# Patient Record
Sex: MALE | Race: WHITE
[De-identification: names, ages, dates, MRNs, and addresses within clinical notes are randomized per-mention and may not be internally consistent; named-entity substitution may affect disease eponyms.]

---
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
## 2020-08-17 ENCOUNTER — HOSPITAL ENCOUNTER (EMERGENCY)
Dept: HOSPITAL 50 - VM.ED | Age: 20
Discharge: ANOTHER HOSPITAL | End: 2020-08-17
Payer: COMMERCIAL

## 2020-08-17 DIAGNOSIS — S52.501A: Primary | ICD-10-CM

## 2020-08-17 DIAGNOSIS — S01.81XA: ICD-10-CM
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
## 2020-08-17 DIAGNOSIS — S01.01XA: ICD-10-CM

## 2020-08-17 LAB
ANION GAP SERPL CALC-SCNC: 13.3 MMOL/L (ref 10–20)
CHLORIDE SERPL-SCNC: 103 MMOL/L (ref 98–107)
SODIUM SERPL-SCNC: 140 MMOL/L (ref 136–145)

## 2020-08-17 PROCEDURE — 85025 COMPLETE CBC W/AUTO DIFF WBC: CPT

## 2020-08-17 PROCEDURE — 36415 COLL VENOUS BLD VENIPUNCTURE: CPT

## 2020-08-17 PROCEDURE — 73110 X-RAY EXAM OF WRIST: CPT

## 2020-08-17 PROCEDURE — 99283 EMERGENCY DEPT VISIT LOW MDM: CPT

## 2020-08-17 PROCEDURE — 85610 PROTHROMBIN TIME: CPT

## 2020-08-17 PROCEDURE — 96375 TX/PRO/DX INJ NEW DRUG ADDON: CPT

## 2020-08-17 PROCEDURE — 73030 X-RAY EXAM OF SHOULDER: CPT

## 2020-08-17 PROCEDURE — 99285 EMERGENCY DEPT VISIT HI MDM: CPT

## 2020-08-17 PROCEDURE — 96376 TX/PRO/DX INJ SAME DRUG ADON: CPT

## 2020-08-17 PROCEDURE — 80053 COMPREHEN METABOLIC PANEL: CPT

## 2020-08-17 PROCEDURE — 12015 RPR F/E/E/N/L/M 7.6-12.5 CM: CPT

## 2020-08-17 PROCEDURE — 70486 CT MAXILLOFACIAL W/O DYE: CPT

## 2020-08-17 PROCEDURE — 96374 THER/PROPH/DIAG INJ IV PUSH: CPT

## 2020-08-17 PROCEDURE — 71045 X-RAY EXAM CHEST 1 VIEW: CPT

## 2020-08-17 PROCEDURE — 70450 CT HEAD/BRAIN W/O DYE: CPT
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
## 2020-08-18 NOTE — CR
______________________________________________________________________________   

  

[id number] RAD/RAD Shoulder Left 2V Min  

EXAM:   

   

 RAD Shoulder Left 2V Min  

   

 CLINICAL DATA:   

   

 TRAUMA  

   

 COMPARISON:   

   

 NO PREVIOUS SIMILAR EXAM IS AVAILABLE.  

   

 FINDINGS:   

   

 No fracture or dislocation is seen.  

   

 There is no radiopaque foreign body in the soft tissues.  

   

 There is no air in the soft tissues.  

   

 There is no cortical thickening or periosteal reaction either.  

   

 IMPRESSION:  

   

 NEGATIVE PLAIN FILM EXAM.  

   

 Electronically signed by Markie Dunham MD on 8/18/2020 8:01 AM  

   

  

Markie Dunham MD                 

 08/18/20 0803    

  

Thank you for allowing us to participate in the care of your patient.

## 2021-05-17 ENCOUNTER — HOSPITAL ENCOUNTER (EMERGENCY)
Dept: HOSPITAL 50 - VM.ED | Age: 21
Discharge: HOME | End: 2021-05-17
Payer: SELF-PAY

## 2021-05-17 DIAGNOSIS — K62.5: Primary | ICD-10-CM

## 2021-05-17 LAB
ANION GAP SERPL CALC-SCNC: 15.8 MMOL/L (ref 5–15)
CHLORIDE SERPL-SCNC: 106 MMOL/L (ref 98–107)
SODIUM SERPL-SCNC: 143 MMOL/L (ref 136–145)

## 2021-05-17 PROCEDURE — C9113 INJ PANTOPRAZOLE SODIUM, VIA: HCPCS

## 2021-05-17 PROCEDURE — G0328 FECAL BLOOD SCRN IMMUNOASSAY: HCPCS

## 2021-05-17 NOTE — EDM.PDOC
ED HPI GENERAL MEDICAL PROBLEM





- General


Chief Complaint: Gastrointestinal Problem


Stated Complaint: ED VISIT


Time Seen by Provider: 05/17/21 15:58


Source of Information: Reports: Patient


History Limitations: Reports: No Limitations





- History of Present Illness


INITIAL COMMENTS - FREE TEXT/NARRATIVE: 





Patient was seen in the clinic in sent her to the ER to be evaluated for ongoing

questionable rectal bleeding and tachycardia.





Patient says he has some vague epigastric abdominal pain that he rates as about 

a 1 out of 2 that started today and has had 5 episodes of bright red bloody 

diarrhea.  He states he ate [employer] for lunch with no issues but states that 

this made the diarrhea a little bit worse.  States she has been eating and 

drinking normally with no issues.





He denies any exposure to any blood products or chemical products at work 

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 

uncontaminated water or camping or eat anything out of the ordinary.  He denies 

any sick contacts





He has no other complaints at this time


Onset: Today


Duration: Hour(s):


Location: Reports: Abdomen


Quality: Reports: Other (crampy )


Severity: Mild


Worsens with: Reports: None.  Denies: Eating


Associated Symptoms: Reports: No Other Symptoms.  Denies: Fever/Chills, 

Headaches, Loss of Appetite, Malaise, Nausea/Vomiting, Rash, Syncope


  ** Bilateral Abdomen


Pain Score (Numeric/FACES): 2





- Related Data


                                    Allergies











Allergy/AdvReac Type Severity Reaction Status Date / Time


 


No Known Drug Allergies Allergy  Other Verified 05/17/21 16:11











Home Meds: 


                                    Home Meds





. [No Known Home Meds]  08/17/20 [History]











Past Medical History





- Past Health History


Medical/Surgical History: Denies Medical/Surgical History





ED ROS GENERAL





- Review of Systems


Review Of Systems: See Below


Constitutional: Reports: No Symptoms


HEENT: Reports: No Symptoms


Respiratory: Reports: No Symptoms


Cardiovascular: Reports: No Symptoms


Endocrine: Reports: No Symptoms


GI/Abdominal: Reports: Abdominal Pain, Bloody Stool, Diarrhea.  Denies: 

Constipation, Distension, Flatus, Hematemesis, Mucous in Stool, Nausea, Stool 

Incontinence, Vomiting


: Reports: No Symptoms


Musculoskeletal: Reports: No Symptoms


Skin: Reports: No Symptoms


Neurological: Reports: No Symptoms


Psychiatric: Reports: No Symptoms


Hematologic/Lymphatic: Reports: No Symptoms


Immunologic: Reports: No Symptoms





ED EXAM, GI/ABD





- Physical Exam


Exam: See Below


Exam Limited By: No Limitations


General Appearance: Alert, WD/WN, No Apparent Distress


Eyes: Bilateral: Normal Appearance


Nose: Normal Inspection, Normal Mucosa, No Blood


Throat/Mouth: Normal Inspection, Normal Lips, Normal Teeth, Normal Gums, Normal 

Oropharynx, Normal Voice, No Airway Compromise, Other (mmm)


Neck: Normal Inspection, Supple, Non-Tender, Full Range of Motion


Respiratory/Chest: No Respiratory Distress, Lungs Clear, Normal Breath Sounds, 

No Accessory Muscle Use, Chest Non-Tender


Cardiovascular: Normal Peripheral Pulses, Regular Rate, Rhythm, No Edema, No 

Gallop, No JVD, No Murmur, No Rub, Tachycardia


GI/Abdominal Exam: Normal Bowel Sounds, Soft, No Organomegaly, No Distention, No

 Abnormal Bruit, No Mass, Tender, Other (Mild tenderness palpation across 

epigastric area patient has no rebound tenderness he has negative obturator heel

 slap pelvic rock Ros[place] )


Extremities: Normal Inspection, Normal Range of Motion, Non-Tender, No Pedal 

Edema, Normal Capillary Refill


Neurological: Alert, Oriented, CN II-XII Intact, Normal Cognition, Normal Gait, 

Normal Reflexes, No Motor/Sensory Deficits


Psychiatric: Normal Affect, Normal Mood


Skin Exam: Warm, Dry, Intact, Normal Color, No Rash





Course





- Vital Signs


Text/Narrative:: 





CBC BMP 1 L normal saline bolus Protonix 40 mg IV  bently 10 mg po 





All lab work within normal limits patient was rechecked states he feels 100% 

better he has no pain at this time heart rate was rechecked and is down to 99 he

 is okay with course of treatment and discharged home and follow-up with his 

primary care provider.





Patient will be given a prescription for Nexium 40 mg 1 p.o. daily #30 Carafate 

1 g p.o. every 12 hours number of 60 and told to follow-up with his primary care

 provider in the next 24 to 48 hours make sure he drinks plenty of water and eat

 a bland diet


bently 10 mg 1 po qday #30 


Rectal was checked negative occult blood was noted patient did have number of 2 

internal hemorrhoids felt one at 6:00 1 at 9:00








Last Recorded V/S: 


                                Last Vital Signs











Temp  37.4 C   05/17/21 15:53


 


Pulse  98   05/17/21 17:19


 


Resp  14   05/17/21 17:19


 


BP  132/74   05/17/21 17:19


 


Pulse Ox  96   05/17/21 17:19














- Orders/Labs/Meds


Orders: 


                               Active Orders 24 hr











 Category Date Time Status


 


 Hemoccult [Fecal Occult Bld Scn Imm] [RC] ASDIRECTED Care  05/17/21 17:32 

Ordered











Labs: 


                                Laboratory Tests











  05/17/21 05/17/21 Range/Units





  16:08 16:08 


 


WBC  9.6   (4.0-10.0)  x10^3/uL


 


RBC  4.98   (4.5-6.0)  x10^6/uL


 


Hgb  15.2   (14.0-18.0)  g/dL


 


Hct  42.0   (40.0-52.0)  %


 


MCV  84.3   (78.0-93.0)  fL


 


MCH  30.5   (26.0-32.0)  pg


 


MCHC  36.2 H   (32.0-36.0)  g/dL


 


RDW Coeff of Marcela  13.8   (10.0-15.0)  %


 


Plt Count  212   (130-400)  x10^3/uL


 


Neut % (Auto)  71.8   (50.0-80.0)  %


 


Lymph % (Auto)  18.9 L   (25.0-50.0)  %


 


Mono % (Auto)  8.3   (2.0-11.0)  %


 


Eos % (Auto)  0.8   (0.0-4.0)  %


 


Baso % (Auto)  0.2   (0.2-1.2)  %


 


Sodium   143  (136-145)  mmol/L


 


Potassium   3.8  (3.5-5.1)  mmol/L


 


Chloride   106  ()  mmol/L


 


Carbon Dioxide   25  (21-32)  mmol/L


 


Anion Gap   15.8 H  (5-15)  mmol/L


 


BUN   14  (7-18)  mg/dL


 


Creatinine   1.0  (0.70-1.30)  mg/dL


 


Est Cr Clr Drug Dosing   TNP  


 


Estimated GFR (MDRD)   > 60  


 


Glucose   90  (70-99)  mg/dL


 


Calcium   9.2  (8.5-10.1)  mg/dL











Meds: 


Medications














Discontinued Medications














Generic Name Dose Route Start Last Admin





  Trade Name Freq  PRN Reason Stop Dose Admin


 


Dicyclomine HCl  10 mg  05/17/21 16:06  05/17/21 16:17





  Dicyclomine 10 Mg Cap  PO  05/17/21 16:07  10 mg





  ONETIME ONE   Administration


 


Sodium Chloride  1,000 mls @ 999 mls/hr  05/17/21 15:59  05/17/21 16:12





  Normal Saline  IV  05/17/21 16:59  999 mls/hr





  ONETIME ONE   Administration


 


Pantoprazole Sodium  40 mg  05/17/21 15:59  05/17/21 16:17





  Pantoprazole 40 Mg Vial  IVPUSH  05/17/21 16:00  40 mg





  ONETIME ONE   Administration














Departure





- Departure


Time of Disposition: 17:40


Disposition: Home, Self-Care 01


Condition: Good


Clinical Impression: 


 Abdominal pain, Rectal bleeding








- Discharge Information


*PRESCRIPTION DRUG MONITORING PROGRAM REVIEWED*: No


*COPY OF PRESCRIPTION DRUG MONITORING REPORT IN PATIENT LISA: No (Suspect that 

the patient has a ulcer we will treat accordingly)


Referrals: 


PCP,None [Primary Care Provider] - 


Forms:  ED Department Discharge





Sepsis Event Note (ED)





- Focused Exam


Vital Signs: 


                                   Vital Signs











  Temp Pulse Resp BP Pulse Ox


 


 05/17/21 17:19   98  14  132/74  96


 


 05/17/21 15:53  37.4 C  115 H  18  149/85 H  96














- Problem List & Annotations


(1) Abdominal pain


SNOMED Code(s): 60872003


   Code(s): R10.9 - UNSPECIFIED ABDOMINAL PAIN   Status: Acute   Current Visit: 

Yes   





(2) Rectal bleeding


SNOMED Code(s): 32782512


   Code(s): K62.5 - HEMORRHAGE OF ANUS AND RECTUM   Status: Acute   Current 

Visit: Yes   





- My Orders


Last 24 Hours: 


My Active Orders





05/17/21 17:32


Hemoccult [Fecal Occult Bld Scn Imm] [RC] ASDIRECTED 














- Assessment/Plan


Last 24 Hours: 


My Active Orders





05/17/21 17:32


Hemoccult [Fecal Occult Bld Scn Imm] [RC] ASDIRECTED

## 2023-06-05 ENCOUNTER — HOSPITAL ENCOUNTER (EMERGENCY)
Dept: HOSPITAL 50 - VM.ED | Age: 23
Discharge: HOME | End: 2023-06-05
Payer: COMMERCIAL

## 2023-06-05 DIAGNOSIS — T67.5XXA: Primary | ICD-10-CM

## 2023-06-05 LAB
ALBUMIN SERPL-MCNC: 4.1 G/DL (ref 3.4–5)
ALBUMIN/GLOB SERPL: 1.21 {RATIO}
ALP SERPL-CCNC: 65 U/L (ref 46–116)
ALT SERPL-CCNC: 43 U/L (ref 16–63)
ANION GAP SERPL CALC-SCNC: 12.7 MMOL/L (ref 5–15)
APPEARANCE UR: CLEAR
AST SERPL-CCNC: 20 U/L (ref 15–37)
BASOPHILS # BLD AUTO: 0 X10^3/UL (ref 0–0.2)
BASOPHILS NFR BLD AUTO: 0.1 % (ref 0.2–1.2)
BILIRUB SERPL-MCNC: 0.5 MG/DL (ref 0.2–1)
BILIRUB UR STRIP-MCNC: NEGATIVE MG/DL
BUN SERPL-MCNC: 11 MG/DL (ref 7–18)
CALCIUM SERPL-MCNC: 8.9 MG/DL (ref 8.5–10.1)
CHLORIDE SERPL-SCNC: 108 MMOL/L (ref 98–107)
CK SERPL-CCNC: 190 U/L (ref 39–308)
CO2 SERPL-SCNC: 26 MMOL/L (ref 21–32)
COLOR UR: YELLOW
CREAT CL 24H UR+SERPL-VRATE: (no result) ML/MIN
CREAT SERPL-MCNC: 0.9 MG/DL (ref 0.7–1.3)
CRP SERPL-MCNC: < 0.2 MG/DL (ref ?–0.9)
EGFRCR SERPLBLD CKD-EPI 2021: 122 ML/MIN (ref 60–?)
EOSINOPHIL # BLD AUTO: 0 X10^3/UL (ref 0–0.5)
EOSINOPHIL NFR BLD AUTO: 0.6 % (ref 0–4)
GLOBULIN SER-MCNC: 3.4 G/DL
GLUCOSE SERPL-MCNC: 95 MG/DL (ref 70–99)
GLUCOSE UR STRIP-MCNC: NEGATIVE MG/DL
HCT VFR BLD AUTO: 42.2 % (ref 40–52)
HGB BLD-MCNC: 15 G/DL (ref 14–18)
IMM GRANULOCYTES # BLD: 0.01 X10^3/UL (ref 0–0.07)
IMM GRANULOCYTES NFR BLD: 0.1 % (ref 0–0.43)
KETONES UR STRIP-MCNC: NEGATIVE MG/DL
LYMPHOCYTES # BLD AUTO: 1.8 X10^3/UL (ref 1–4.8)
LYMPHOCYTES NFR BLD AUTO: 24.6 % (ref 25–50)
MCH RBC QN AUTO: 30.9 PG (ref 26–32)
MCHC RBC AUTO-ENTMCNC: 35.5 G/DL (ref 32–36)
MCHC RBC AUTO-ENTMCNC: 86.8 FL (ref 78–93)
MONOCYTES # BLD AUTO: 0.5 X10^3/UL (ref 0–0.8)
MONOCYTES NFR BLD AUTO: 7.3 % (ref 2–11)
NEUTROPHILS # BLD AUTO: 4.9 X10^3/UL (ref 1.8–7.7)
NEUTROPHILS NFR BLD AUTO: 67.3 % (ref 50–80)
NITRITE UR QL: NEGATIVE
PH UR STRIP: 6.5 [PH] (ref 5–8)
PLATELET # BLD AUTO: 212 X10^3/UL (ref 130–400)
POTASSIUM SERPL-SCNC: 3.7 MMOL/L (ref 3.5–5.1)
PROT SERPL-MCNC: 7.5 G/DL (ref 6.4–8.2)
PROT UR STRIP-MCNC: NEGATIVE MG/DL
RBC # BLD AUTO: 4.86 X10^6/UL (ref 4.5–6)
RBC UR QL: NEGATIVE
SODIUM SERPL-SCNC: 143 MMOL/L (ref 136–145)
SP GR UR STRIP: 1.01 (ref 1–1.03)
UROBILINOGEN UR STRIP-ACNC: 0.2 EU/DL
WBC # BLD AUTO: 7.2 X10^3/UL (ref 4–10)

## 2023-06-29 ENCOUNTER — HOSPITAL ENCOUNTER (EMERGENCY)
Dept: HOSPITAL 50 - VM.ED | Age: 23
Discharge: HOME | End: 2023-06-29
Payer: COMMERCIAL

## 2023-06-29 DIAGNOSIS — J40: Primary | ICD-10-CM

## 2023-06-29 LAB
ALBUMIN SERPL-MCNC: 4.4 G/DL (ref 3.4–5)
ALBUMIN/GLOB SERPL: 1.02 {RATIO}
ALP SERPL-CCNC: 81 U/L (ref 46–116)
ALT SERPL-CCNC: 47 U/L (ref 16–63)
ANION GAP SERPL CALC-SCNC: 18.8 MMOL/L (ref 5–15)
AST SERPL-CCNC: 23 U/L (ref 15–37)
BASOPHILS # BLD AUTO: 0 X10^3/UL (ref 0–0.2)
BASOPHILS NFR BLD AUTO: 0.3 % (ref 0.2–1.2)
BILIRUB SERPL-MCNC: 0.9 MG/DL (ref 0.2–1)
BUN SERPL-MCNC: 10 MG/DL (ref 7–18)
CALCIUM SERPL-MCNC: 9.2 MG/DL (ref 8.5–10.1)
CHLORIDE SERPL-SCNC: 105 MMOL/L (ref 98–107)
CO2 SERPL-SCNC: 23 MMOL/L (ref 21–32)
CREAT CL 24H UR+SERPL-VRATE: (no result) ML/MIN
CREAT SERPL-MCNC: 0.9 MG/DL (ref 0.7–1.3)
CRP SERPL-MCNC: 0.45 MG/DL (ref ?–0.3)
EGFRCR SERPLBLD CKD-EPI 2021: 122 ML/MIN (ref 60–?)
EOSINOPHIL # BLD AUTO: 0.1 X10^3/UL (ref 0–0.5)
EOSINOPHIL NFR BLD AUTO: 1.5 % (ref 0–4)
GLOBULIN SER-MCNC: 4.3 G/DL
GLUCOSE SERPL-MCNC: 93 MG/DL (ref 70–99)
HCT VFR BLD AUTO: 46.3 % (ref 40–52)
HGB BLD-MCNC: 17.3 G/DL (ref 14–18)
IMM GRANULOCYTES # BLD: 0.01 X10^3/UL (ref 0–0.07)
IMM GRANULOCYTES NFR BLD: 0.1 % (ref 0–0.43)
LYMPHOCYTES # BLD AUTO: 1.4 X10^3/UL (ref 1–4.8)
LYMPHOCYTES NFR BLD AUTO: 18.8 % (ref 25–50)
MCH RBC QN AUTO: 31.2 PG (ref 26–32)
MCHC RBC AUTO-ENTMCNC: 37.4 G/DL (ref 32–36)
MCHC RBC AUTO-ENTMCNC: 83.4 FL (ref 78–93)
MONOCYTES # BLD AUTO: 0.8 X10^3/UL (ref 0–0.8)
MONOCYTES NFR BLD AUTO: 10.4 % (ref 2–11)
NEUTROPHILS # BLD AUTO: 5.1 X10^3/UL (ref 1.8–7.7)
NEUTROPHILS NFR BLD AUTO: 68.9 % (ref 50–80)
PLATELET # BLD AUTO: 257 X10^3/UL (ref 130–400)
POTASSIUM SERPL-SCNC: 3.8 MMOL/L (ref 3.5–5.1)
PROT SERPL-MCNC: 8.7 G/DL (ref 6.4–8.2)
RBC # BLD AUTO: 5.55 X10^6/UL (ref 4.5–6)
SODIUM SERPL-SCNC: 143 MMOL/L (ref 136–145)
WBC # BLD AUTO: 7.4 X10^3/UL (ref 4–10)

## 2023-09-05 ENCOUNTER — HOSPITAL ENCOUNTER (EMERGENCY)
Dept: HOSPITAL 50 - VM.ED | Age: 23
Discharge: HOME | End: 2023-09-05
Payer: COMMERCIAL

## 2023-09-05 DIAGNOSIS — F41.9: Primary | ICD-10-CM

## 2025-03-25 ENCOUNTER — HOSPITAL ENCOUNTER (EMERGENCY)
Dept: HOSPITAL 50 - VM.ED | Age: 25
Discharge: HOME | End: 2025-03-25
Payer: SELF-PAY

## 2025-03-25 DIAGNOSIS — J20.8: Primary | ICD-10-CM

## 2025-03-25 DIAGNOSIS — Z79.899: ICD-10-CM

## 2025-03-25 DIAGNOSIS — Z79.51: ICD-10-CM

## 2025-03-25 LAB
ALBUMIN SERPL-MCNC: 4.2 G/DL (ref 3.4–5)
ALBUMIN/GLOB SERPL: 1.11 {RATIO}
ALP SERPL-CCNC: 71 U/L (ref 46–116)
ALT SERPL-CCNC: 44 U/L (ref 16–63)
ANION GAP SERPL CALC-SCNC: 21.5 MMOL/L (ref 5–15)
AST SERPL-CCNC: 29 U/L (ref 15–37)
BASOPHILS # BLD AUTO: 0 X10^3/UL (ref 0–0.2)
BASOPHILS NFR BLD AUTO: 0.2 % (ref 0.2–1.2)
BILIRUB SERPL-MCNC: 0.8 MG/DL (ref 0.2–1)
BUN SERPL-MCNC: 12 MG/DL (ref 7–18)
CALCIUM SERPL-MCNC: 8.9 MG/DL (ref 8.5–10.1)
CHLORIDE SERPL-SCNC: 101 MMOL/L (ref 98–107)
CO2 SERPL-SCNC: 22 MMOL/L (ref 21–32)
CREAT CL 24H UR+SERPL-VRATE: (no result) ML/MIN
CREAT SERPL-MCNC: 1 MG/DL (ref 0.7–1.3)
EGFRCR SERPLBLD CKD-EPI 2021: 106 ML/MIN (ref 60–?)
EOSINOPHIL # BLD AUTO: 0.1 X10^3/UL (ref 0–0.5)
EOSINOPHIL NFR BLD AUTO: 1.1 % (ref 0–4)
GLOBULIN SER-MCNC: 3.8 G/DL
GLUCOSE SERPL-MCNC: 125 MG/DL (ref 70–99)
HCT VFR BLD AUTO: 44.2 % (ref 40–52)
HGB BLD-MCNC: 16.3 G/DL (ref 14–18)
IMM GRANULOCYTES # BLD: 0.01 X10^3/UL (ref 0–0.07)
IMM GRANULOCYTES NFR BLD: 0.2 % (ref 0–0.43)
LYMPHOCYTES # BLD AUTO: 1 X10^3/UL (ref 1–4.8)
LYMPHOCYTES NFR BLD AUTO: 16.1 % (ref 25–50)
MAGNESIUM SERPL-MCNC: 1.8 MG/DL (ref 1.8–2.4)
MCH RBC QN AUTO: 30.8 PG (ref 26–32)
MCHC RBC AUTO-ENTMCNC: 36.9 G/DL (ref 32–36)
MCHC RBC AUTO-ENTMCNC: 83.4 FL (ref 78–93)
MONOCYTES # BLD AUTO: 0.5 X10^3/UL (ref 0–0.8)
MONOCYTES NFR BLD AUTO: 8.2 % (ref 2–11)
NEUTROPHILS # BLD AUTO: 4.7 X10^3/UL (ref 1.8–7.7)
NEUTROPHILS NFR BLD AUTO: 74.2 % (ref 50–80)
PLATELET # BLD AUTO: 216 X10^3/UL (ref 130–400)
POTASSIUM SERPL-SCNC: 3.5 MMOL/L (ref 3.5–5.1)
PROT SERPL-MCNC: 8 G/DL (ref 6.4–8.2)
RBC # BLD AUTO: 5.3 X10^6/UL (ref 4.5–6)
SODIUM SERPL-SCNC: 141 MMOL/L (ref 136–145)
WBC # BLD AUTO: 6.3 X10^3/UL (ref 4–10)

## 2025-03-25 RX ADMIN — GUAIFENESIN AND CODEINE PHOSPHATE ONE ML: 100; 10 SOLUTION ORAL at 11:11

## 2025-03-25 RX ADMIN — LORAZEPAM ONE MG: 2 INJECTION INTRAMUSCULAR; INTRAVENOUS at 11:11

## 2025-04-06 ENCOUNTER — HOSPITAL ENCOUNTER (EMERGENCY)
Dept: HOSPITAL 50 - VM.ED | Age: 25
Discharge: HOME | End: 2025-04-06
Payer: SELF-PAY
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
## 2025-04-06 DIAGNOSIS — M79.671: Primary | ICD-10-CM
